# Patient Record
Sex: MALE | Race: BLACK OR AFRICAN AMERICAN | NOT HISPANIC OR LATINO
[De-identification: names, ages, dates, MRNs, and addresses within clinical notes are randomized per-mention and may not be internally consistent; named-entity substitution may affect disease eponyms.]

---

## 2017-02-10 ENCOUNTER — NON-APPOINTMENT (OUTPATIENT)
Age: 42
End: 2017-02-10

## 2017-02-10 ENCOUNTER — APPOINTMENT (OUTPATIENT)
Dept: CARDIOLOGY | Facility: CLINIC | Age: 42
End: 2017-02-10

## 2017-02-10 VITALS — HEART RATE: 61 BPM | SYSTOLIC BLOOD PRESSURE: 146 MMHG | OXYGEN SATURATION: 95 % | DIASTOLIC BLOOD PRESSURE: 112 MMHG

## 2017-03-10 ENCOUNTER — APPOINTMENT (OUTPATIENT)
Dept: CARDIOLOGY | Facility: CLINIC | Age: 42
End: 2017-03-10

## 2017-07-17 ENCOUNTER — RX RENEWAL (OUTPATIENT)
Age: 42
End: 2017-07-17

## 2017-11-03 ENCOUNTER — RX RENEWAL (OUTPATIENT)
Age: 42
End: 2017-11-03

## 2018-03-01 ENCOUNTER — RX RENEWAL (OUTPATIENT)
Age: 43
End: 2018-03-01

## 2018-05-31 ENCOUNTER — RX RENEWAL (OUTPATIENT)
Age: 43
End: 2018-05-31

## 2018-06-14 ENCOUNTER — RX RENEWAL (OUTPATIENT)
Age: 43
End: 2018-06-14

## 2018-09-13 ENCOUNTER — RX RENEWAL (OUTPATIENT)
Age: 43
End: 2018-09-13

## 2018-12-28 ENCOUNTER — RX RENEWAL (OUTPATIENT)
Age: 43
End: 2018-12-28

## 2018-12-31 ENCOUNTER — RX RENEWAL (OUTPATIENT)
Age: 43
End: 2018-12-31

## 2019-02-04 ENCOUNTER — NON-APPOINTMENT (OUTPATIENT)
Age: 44
End: 2019-02-04

## 2019-02-04 ENCOUNTER — APPOINTMENT (OUTPATIENT)
Dept: CARDIOLOGY | Facility: CLINIC | Age: 44
End: 2019-02-04
Payer: COMMERCIAL

## 2019-02-04 VITALS
HEIGHT: 68 IN | OXYGEN SATURATION: 98 % | BODY MASS INDEX: 37.89 KG/M2 | DIASTOLIC BLOOD PRESSURE: 86 MMHG | WEIGHT: 250 LBS | SYSTOLIC BLOOD PRESSURE: 136 MMHG | HEART RATE: 60 BPM

## 2019-02-04 DIAGNOSIS — I48.0 PAROXYSMAL ATRIAL FIBRILLATION: ICD-10-CM

## 2019-02-04 DIAGNOSIS — I10 ESSENTIAL (PRIMARY) HYPERTENSION: ICD-10-CM

## 2019-02-04 PROCEDURE — 99214 OFFICE O/P EST MOD 30 MIN: CPT

## 2019-02-04 PROCEDURE — 93000 ELECTROCARDIOGRAM COMPLETE: CPT

## 2019-02-04 NOTE — HISTORY OF PRESENT ILLNESS
[FreeTextEntry1] : Here for followup. BP better. Numbness in left arm. Brother had MI recently at 50 and cousin just  at 56. Very concerned\par Taking meds

## 2019-02-04 NOTE — PHYSICAL EXAM
[General Appearance - Well Developed] : well developed [Normal Appearance] : normal appearance [Well Groomed] : well groomed [General Appearance - Well Nourished] : well nourished [No Deformities] : no deformities [General Appearance - In No Acute Distress] : no acute distress [Normal Oral Mucosa] : normal oral mucosa [No Oral Pallor] : no oral pallor [No Oral Cyanosis] : no oral cyanosis [Normal Jugular Venous A Waves Present] : normal jugular venous A waves present [Normal Jugular Venous V Waves Present] : normal jugular venous V waves present [No Jugular Venous Montanez A Waves] : no jugular venous montanez A waves [Respiration, Rhythm And Depth] : normal respiratory rhythm and effort [Exaggerated Use Of Accessory Muscles For Inspiration] : no accessory muscle use [Auscultation Breath Sounds / Voice Sounds] : lungs were clear to auscultation bilaterally [Heart Rate And Rhythm] : heart rate and rhythm were normal [Heart Sounds] : normal S1 and S2 [Murmurs] : no murmurs present [Abdomen Soft] : soft [Abdomen Tenderness] : non-tender [Abdomen Mass (___ Cm)] : no abdominal mass palpated [Abnormal Walk] : normal gait [Gait - Sufficient For Exercise Testing] : the gait was sufficient for exercise testing [Nail Clubbing] : no clubbing of the fingernails [Cyanosis, Localized] : no localized cyanosis [Petechial Hemorrhages (___cm)] : no petechial hemorrhages [Skin Color & Pigmentation] : normal skin color and pigmentation [] : no rash [No Venous Stasis] : no venous stasis [Skin Lesions] : no skin lesions [No Skin Ulcers] : no skin ulcer [No Xanthoma] : no  xanthoma was observed [Oriented To Time, Place, And Person] : oriented to person, place, and time [Affect] : the affect was normal [Mood] : the mood was normal [No Anxiety] : not feeling anxious

## 2019-02-04 NOTE — DISCUSSION/SUMMARY
[FreeTextEntry1] : 43 year old man with PAF and HTN, now with numbness in left arm\par -Cont metoprolol\par -Cont Norvasc\par -given numbness and strong family history- will schedule stress echo

## 2019-02-27 ENCOUNTER — APPOINTMENT (OUTPATIENT)
Dept: CV DIAGNOSITCS | Facility: HOSPITAL | Age: 44
End: 2019-02-27

## 2019-04-01 ENCOUNTER — RX RENEWAL (OUTPATIENT)
Age: 44
End: 2019-04-01

## 2019-07-22 ENCOUNTER — RX RENEWAL (OUTPATIENT)
Age: 44
End: 2019-07-22

## 2019-10-30 ENCOUNTER — RX RENEWAL (OUTPATIENT)
Age: 44
End: 2019-10-30

## 2020-02-08 ENCOUNTER — RX RENEWAL (OUTPATIENT)
Age: 45
End: 2020-02-08

## 2020-03-03 ENCOUNTER — RX RENEWAL (OUTPATIENT)
Age: 45
End: 2020-03-03

## 2020-06-01 ENCOUNTER — RX RENEWAL (OUTPATIENT)
Age: 45
End: 2020-06-01

## 2021-01-03 ENCOUNTER — RX RENEWAL (OUTPATIENT)
Age: 46
End: 2021-01-03

## 2021-08-18 ENCOUNTER — RX RENEWAL (OUTPATIENT)
Age: 46
End: 2021-08-18

## 2022-03-08 ENCOUNTER — RX RENEWAL (OUTPATIENT)
Age: 47
End: 2022-03-08

## 2022-11-05 ENCOUNTER — RX RENEWAL (OUTPATIENT)
Age: 47
End: 2022-11-05

## 2022-11-05 RX ORDER — AMLODIPINE BESYLATE 5 MG/1
5 TABLET ORAL DAILY
Qty: 90 | Refills: 1 | Status: ACTIVE | COMMUNITY
Start: 2017-02-10 | End: 1900-01-01

## 2024-04-13 ENCOUNTER — EMERGENCY (EMERGENCY)
Facility: HOSPITAL | Age: 49
LOS: 1 days | Discharge: HOME | End: 2024-04-13
Attending: EMERGENCY MEDICINE | Admitting: EMERGENCY MEDICINE
Payer: SELF-PAY

## 2024-04-13 VITALS
HEIGHT: 68 IN | SYSTOLIC BLOOD PRESSURE: 164 MMHG | WEIGHT: 214.95 LBS | HEART RATE: 89 BPM | TEMPERATURE: 99 F | DIASTOLIC BLOOD PRESSURE: 112 MMHG | RESPIRATION RATE: 16 BRPM | OXYGEN SATURATION: 96 %

## 2024-04-13 VITALS
HEART RATE: 67 BPM | SYSTOLIC BLOOD PRESSURE: 150 MMHG | RESPIRATION RATE: 18 BRPM | DIASTOLIC BLOOD PRESSURE: 82 MMHG | OXYGEN SATURATION: 100 % | TEMPERATURE: 98 F

## 2024-04-13 LAB
ALBUMIN SERPL ELPH-MCNC: 4.2 G/DL — SIGNIFICANT CHANGE UP (ref 3.3–5)
ALP SERPL-CCNC: 49 U/L — SIGNIFICANT CHANGE UP (ref 40–120)
ALT FLD-CCNC: 14 U/L — SIGNIFICANT CHANGE UP (ref 4–41)
ANION GAP SERPL CALC-SCNC: 11 MMOL/L — SIGNIFICANT CHANGE UP (ref 7–14)
AST SERPL-CCNC: 18 U/L — SIGNIFICANT CHANGE UP (ref 4–40)
BASOPHILS # BLD AUTO: 0.05 K/UL — SIGNIFICANT CHANGE UP (ref 0–0.2)
BASOPHILS NFR BLD AUTO: 0.5 % — SIGNIFICANT CHANGE UP (ref 0–2)
BILIRUB SERPL-MCNC: 0.6 MG/DL — SIGNIFICANT CHANGE UP (ref 0.2–1.2)
BUN SERPL-MCNC: 16 MG/DL — SIGNIFICANT CHANGE UP (ref 7–23)
CALCIUM SERPL-MCNC: 8.8 MG/DL — SIGNIFICANT CHANGE UP (ref 8.4–10.5)
CHLORIDE SERPL-SCNC: 106 MMOL/L — SIGNIFICANT CHANGE UP (ref 98–107)
CO2 SERPL-SCNC: 26 MMOL/L — SIGNIFICANT CHANGE UP (ref 22–31)
CREAT SERPL-MCNC: 0.93 MG/DL — SIGNIFICANT CHANGE UP (ref 0.5–1.3)
EGFR: 103 ML/MIN/1.73M2 — SIGNIFICANT CHANGE UP
EOSINOPHIL # BLD AUTO: 0.1 K/UL — SIGNIFICANT CHANGE UP (ref 0–0.5)
EOSINOPHIL NFR BLD AUTO: 1.1 % — SIGNIFICANT CHANGE UP (ref 0–6)
GLUCOSE SERPL-MCNC: 148 MG/DL — HIGH (ref 70–99)
HCT VFR BLD CALC: 42.7 % — SIGNIFICANT CHANGE UP (ref 39–50)
HGB BLD-MCNC: 14.6 G/DL — SIGNIFICANT CHANGE UP (ref 13–17)
IANC: 7.57 K/UL — HIGH (ref 1.8–7.4)
IMM GRANULOCYTES NFR BLD AUTO: 0.3 % — SIGNIFICANT CHANGE UP (ref 0–0.9)
LYMPHOCYTES # BLD AUTO: 0.98 K/UL — LOW (ref 1–3.3)
LYMPHOCYTES # BLD AUTO: 10.4 % — LOW (ref 13–44)
MCHC RBC-ENTMCNC: 31.2 PG — SIGNIFICANT CHANGE UP (ref 27–34)
MCHC RBC-ENTMCNC: 34.2 GM/DL — SIGNIFICANT CHANGE UP (ref 32–36)
MCV RBC AUTO: 91.2 FL — SIGNIFICANT CHANGE UP (ref 80–100)
MONOCYTES # BLD AUTO: 0.72 K/UL — SIGNIFICANT CHANGE UP (ref 0–0.9)
MONOCYTES NFR BLD AUTO: 7.6 % — SIGNIFICANT CHANGE UP (ref 2–14)
NEUTROPHILS # BLD AUTO: 7.57 K/UL — HIGH (ref 1.8–7.4)
NEUTROPHILS NFR BLD AUTO: 80.1 % — HIGH (ref 43–77)
NRBC # BLD: 0 /100 WBCS — SIGNIFICANT CHANGE UP (ref 0–0)
NRBC # FLD: 0 K/UL — SIGNIFICANT CHANGE UP (ref 0–0)
PLATELET # BLD AUTO: 323 K/UL — SIGNIFICANT CHANGE UP (ref 150–400)
POTASSIUM SERPL-MCNC: 3.9 MMOL/L — SIGNIFICANT CHANGE UP (ref 3.5–5.3)
POTASSIUM SERPL-SCNC: 3.9 MMOL/L — SIGNIFICANT CHANGE UP (ref 3.5–5.3)
PROT SERPL-MCNC: 6.7 G/DL — SIGNIFICANT CHANGE UP (ref 6–8.3)
RBC # BLD: 4.68 M/UL — SIGNIFICANT CHANGE UP (ref 4.2–5.8)
RBC # FLD: 12.9 % — SIGNIFICANT CHANGE UP (ref 10.3–14.5)
SODIUM SERPL-SCNC: 143 MMOL/L — SIGNIFICANT CHANGE UP (ref 135–145)
WBC # BLD: 9.45 K/UL — SIGNIFICANT CHANGE UP (ref 3.8–10.5)
WBC # FLD AUTO: 9.45 K/UL — SIGNIFICANT CHANGE UP (ref 3.8–10.5)

## 2024-04-13 PROCEDURE — 93971 EXTREMITY STUDY: CPT | Mod: 26,LT

## 2024-04-13 PROCEDURE — 99053 MED SERV 10PM-8AM 24 HR FAC: CPT

## 2024-04-13 PROCEDURE — 73562 X-RAY EXAM OF KNEE 3: CPT | Mod: 26,LT

## 2024-04-13 PROCEDURE — 73630 X-RAY EXAM OF FOOT: CPT | Mod: 26,LT

## 2024-04-13 PROCEDURE — 73610 X-RAY EXAM OF ANKLE: CPT | Mod: 26,LT

## 2024-04-13 PROCEDURE — 99285 EMERGENCY DEPT VISIT HI MDM: CPT

## 2024-04-13 PROCEDURE — 93010 ELECTROCARDIOGRAM REPORT: CPT

## 2024-04-13 PROCEDURE — 73590 X-RAY EXAM OF LOWER LEG: CPT | Mod: 26,LT

## 2024-04-13 RX ORDER — IBUPROFEN 200 MG
600 TABLET ORAL ONCE
Refills: 0 | Status: COMPLETED | OUTPATIENT
Start: 2024-04-13 | End: 2024-04-13

## 2024-04-13 RX ORDER — ACETAMINOPHEN 500 MG
975 TABLET ORAL ONCE
Refills: 0 | Status: COMPLETED | OUTPATIENT
Start: 2024-04-13 | End: 2024-04-13

## 2024-04-13 RX ADMIN — Medication 600 MILLIGRAM(S): at 08:39

## 2024-04-13 RX ADMIN — Medication 975 MILLIGRAM(S): at 08:39

## 2024-04-13 NOTE — ED PROVIDER NOTE - ATTENDING CONTRIBUTION TO CARE
Attending Statement: I have personally seen and examined this patient. I have fully participated in the care of this patient. I have reviewed all pertinent clinical information, including history physical exam, plan and the Resident's note and agree except as noted  48M with a h/o HTN (non-compliant with meds), varicose veins, diverticulitis   Presents from home chief complaint of pain to the left foot on the way to the left knee.  Endorsing pain to the back of the left knee painful to bend to the leg.  Has been limping to get around.  Patient states that a couple nights ago he banged his left foot on a furniture at home and since then the pain and swelling has begun.  No falls no other trauma.  No numbness no weakness.  Not on blood thinners.  Has a history of hypertension but admits he has been nonadherent to meds.  Smokes marijuana but denies any other drug use or alcohol use.  No travel history.  No history of PE or DVT.  No recent hospitalizations.  Not on anticoagulation.  Vital signs noted patient hypotensive 169/102 heart rate 80 satting 97% on room air.  Nontoxic male laying in bed ANO x 3.  Talking in full clear sentences no work of breathing appears comfortable.  Abdomen nontender.  Left foot swollen tender midfoot and toes with some ecchymosis to palpation left foot toes 2-4.  Tender to palpation.  No crepitus.  No break in skin.  Positive pulses.  Mild swelling of the left leg to the knee no calf tenderness but tender to the kind of the left knee with decreased range of motion due to pain.  Gait not tested.  Plan cardiac monitor, EKG, labs, x-ray of the left foot\ankle\tib-fib\knee, ultrasound of the left leg and reassess.  Monitor blood pressure in the emergency department will do basic labs due to hypotension.  Discussed with patient in layman terms importance of being adherent to his medication.  Discussed with patient long-term consequences of untreated hypertension.

## 2024-04-13 NOTE — ED PROVIDER NOTE - PROGRESS NOTE DETAILS
Carlos Almaguer PGY3: pt reassessed, pain moderately improved with medication. Results discussed, pending podiatry consult/recs. Pt to f/u with podiatry and will refer to PCP for management of High blood pressure. Return precautions discussed, verbal understanding demonstrated, all questions answered. Patient has no complaints at this time.  Aware he is still waiting for podiatry to come for evaluation.  Patient willing to wait for now. Carloscheri Almaguer PGY3: pt no longer wishes to wait for podiatry. spoke with podiatry, unable to see pt in timely fashion, rec shoe and OP f/u. shoe provided. Return precautions discussed, verbal understanding demonstrated, all questions answered.

## 2024-04-13 NOTE — ED PROVIDER NOTE - NSFOLLOWUPINSTRUCTIONS_ED_ALL_ED_FT
You were seen in the ED for your left leg pain and found to have fractures of the proximal phalanx of your 3rd and 4th toes (base of the toes).     You were seen by Podiatry in the ED, please follow up with them in the outpatient office for continued care.    Please follow up with a PCP for management of your blood pressure. Our referral department will call you to help set up your follow up appointment.     You may take Tylenol (1000mg), and/or Ibuprofen (400-600mg) as needed for pain. Please follow dosing instructions on medication labeling.    Fracture    A fracture is a break in one of your bones. This can occur from a variety of injuries, especially traumatic ones. Symptoms include pain, bruising, or swelling. Do not use the injured limb. If a fracture is in one of the bones below your waist, do not put weight on that limb unless instructed to do so by your healthcare provider. Crutches or a cane may have been provided. A splint or cast may have been applied by your health care provider. Make sure to keep it dry and follow up with an orthopedist as instructed.    SEEK IMMEDIATE MEDICAL CARE IF YOU HAVE ANY OF THE FOLLOWING SYMPTOMS: numbness, tingling, increasing pain, or weakness in any part of the injured limb.

## 2024-04-13 NOTE — ED PROVIDER NOTE - NSPTACCESSSVCSAPPTDETAILS_ED_ALL_ED_FT
Patient needs a new primary care physician for management of his high blood pressure. Patient needs a new primary care physician for management of his high blood pressure.  Podiatry for left toe fractures.

## 2024-04-13 NOTE — ED ADULT TRIAGE NOTE - CHIEF COMPLAINT QUOTE
Pt states, "I banged my left foot against the bed x2 days ago, now the back of my left knee hurts and is swollen." Steady gait, states it is uncomfortable to sit down, prefers to stand. Past medical history of varicose veins and HTN, non-compliant with BP meds. Pt denying CP, SOB, visual changes or headaches.

## 2024-04-13 NOTE — ED PROVIDER NOTE - PATIENT PORTAL LINK FT
You can access the FollowMyHealth Patient Portal offered by [facility] by registering at the following website: [email or web address]. By joining [employer]’s FollowMyHealth portal, you will also be able to view your health information using other applications (apps) compatible with our system.

## 2024-04-13 NOTE — ED PROVIDER NOTE - PHYSICAL EXAMINATION
GENERAL: well appearing in no acute distress, non-toxic appearing  NEURO: no focal motor or sensory deficits, normal gait,  MSK: L knee ROM 2/2 pain, +L post knee swelling and ttp, +L foot toes 2-4 ttp, no calf tenderness b/l  SKIN: well-perfused, extremities warm, no visible rashes

## 2024-04-13 NOTE — ED ADULT NURSE NOTE - OBJECTIVE STATEMENT
Patient presents to the ED for left foot pain. He is A&Ox4, in no acute distress, calm. He states 2 days ago, he banged his left foot against bed and now he has pain radiating up towards knee. He states his left leg seems more swollen than normal. Denies CP, dizziness, SOB, dyspnea, N/V, fevers, chills. Left foot 3rd and 4th toe bruised with mild swelling. Respirations even, unlabored on room air. No pallor, no cyanosis. History of HTN. 20G IV placed left AC, labs drawn and sent. Medicated as ordered. Pending XR and US.

## 2024-04-13 NOTE — ED PROVIDER NOTE - CLINICAL SUMMARY MEDICAL DECISION MAKING FREE TEXT BOX
48M with a h/o HTN (non-compliant with meds), varicose veins, diverticulitis who presents 2 days after a traumatic injury with LLE pain. Pt was chasing a dog in his house 2days ago when he hit his foot into a piece of furniture. No complaining of L foot and knee pain with reduced rom of left knee and pain and swelling posteriorly. No identified rf for VTE, ROS negative. pt hypertensive here 164/112, remaining vss. Exam as above. r/o fracture, likely contusion, possible ligamentous injury with hemarthrosis, vs bursitis, less likely DVT. Will get labs, XR, US, give pain meds, and reassess.